# Patient Record
(demographics unavailable — no encounter records)

---

## 2025-07-23 NOTE — DISCUSSION/SUMMARY
[de-identified] : Chief complaint   Neck pain  HPI patient is a very pleasant 64-year-old coming in with neck pain.  It radiates down both arms. Patient denies any trauma.  Is been for several months.  Acute on chronic.  Is numbness and tingling.  The patient went to the ER got medications.  This was done at [facility] and got a CAT scan.  No fevers or chills.  No issues with gait or balance bowel bladder incontinence.  Pain score is 10 out of 10  Pt is NAD, AAOX3 skin is intact no palpable stepoff 5/5 motor strength BUE SILT C5-T1 BUE POSITIVE SPURLING Negative rondon normal rapid  test equal reflexes bicep/BR/tricep hand wwp bcr  I personally reviewed 4 view x-ray cervical spine demonstrates C4-C7 DDD  I discussed at length with the patient nature of the condition.  Patient presents with cervical pain secondary to C4-C7 DDD and stenosis.  Patient has radiating symptoms as well.  At this time we will get a high-dose prednisone prescription tramadol and a cervical MRI.  We discussed the side effects of the drugs including GI upset and sedation.  The patient is traveling the patient can follow-up with pain management for consideration of a cervical epidural.  All the patient's questions were answered.